# Patient Record
(demographics unavailable — no encounter records)

---

## 2025-04-11 NOTE — HEALTH RISK ASSESSMENT
[Good] : ~his/her~  mood as  good [No] : In the past 12 months have you used drugs other than those required for medical reasons? No [Any fall with injury in past year] : Patient reported fall with injury in the past year [0] : 1) Little interest or pleasure doing things: Not at all (0) [With Significant Other] : lives with significant other [Retired] : retired [Fully functional (bathing, dressing, toileting, transferring, walking, feeding)] : Fully functional (bathing, dressing, toileting, transferring, walking, feeding) [Fully functional (using the telephone, shopping, preparing meals, housekeeping, doing laundry, using] : Fully functional and needs no help or supervision to perform IADLs (using the telephone, shopping, preparing meals, housekeeping, doing laundry, using transportation, managing medications and managing finances) [Smoke Detector] : smoke detector [Safety elements used in home] : safety elements used in home [Seat Belt] :  uses seat belt [Sunscreen] : uses sunscreen [Never] : Never [HIV test declined] : HIV test declined [Hepatitis C test declined] : Hepatitis C test declined [FreeTextEntry1] : throat dryness [Audit-CScore] : 0 [de-identified] : walking [de-identified] : trying [de-identified] : pt fell and hurt her shoulder and jaw [Reports changes in hearing] : Reports no changes in hearing [Reports changes in vision] : Reports no changes in vision [Reports changes in dental health] : Reports no changes in dental health [MammogramDate] : 03/2024 [MammogramComments] : H/O 4 Surgeries Breast Lumpectomies / [facility]  [PapSmearComments] : awhile [BoneDensityComments] : awhile [ColonoscopyComments] : awhile

## 2025-04-11 NOTE — HEALTH RISK ASSESSMENT
[Good] : ~his/her~  mood as  good [No] : In the past 12 months have you used drugs other than those required for medical reasons? No [Any fall with injury in past year] : Patient reported fall with injury in the past year [0] : 1) Little interest or pleasure doing things: Not at all (0) [With Significant Other] : lives with significant other [Retired] : retired [Fully functional (bathing, dressing, toileting, transferring, walking, feeding)] : Fully functional (bathing, dressing, toileting, transferring, walking, feeding) [Fully functional (using the telephone, shopping, preparing meals, housekeeping, doing laundry, using] : Fully functional and needs no help or supervision to perform IADLs (using the telephone, shopping, preparing meals, housekeeping, doing laundry, using transportation, managing medications and managing finances) [Smoke Detector] : smoke detector [Safety elements used in home] : safety elements used in home [Seat Belt] :  uses seat belt [Sunscreen] : uses sunscreen [Never] : Never [HIV test declined] : HIV test declined [Hepatitis C test declined] : Hepatitis C test declined [FreeTextEntry1] : throat dryness [Audit-CScore] : 0 [de-identified] : walking [de-identified] : trying [de-identified] : pt fell and hurt her shoulder and jaw [Reports changes in hearing] : Reports no changes in hearing [Reports changes in vision] : Reports no changes in vision [Reports changes in dental health] : Reports no changes in dental health [MammogramDate] : 03/2024 [MammogramComments] : H/O 4 Surgeries Breast Lumpectomies / [facility]  [PapSmearComments] : awhile [BoneDensityComments] : awhile [ColonoscopyComments] : awhile

## 2025-04-11 NOTE — HISTORY OF PRESENT ILLNESS
[Friend] : friend [FreeTextEntry1] : New Pt [de-identified] : 69 y toshia presents today, seeking new PCP, Throat , wakes up in middle of night, bad taste in mouth, +Phegm, reports taking pantoprazole approx 1 year with no relief . Retired from years as  at Hospital Unit/ Administrative type position, lives with , Pt/  go back and forth to [facility] ( Home of Origin) Reports multiple Right Breast Biopsies with multiple Lumpectomies , suggested by 2 Breast Specialists 2 get Breast removed ( Precautionary) Pt declined this suggestion. Did not take BP pill this AM, rushing to get to Appt?

## 2025-04-11 NOTE — HISTORY OF PRESENT ILLNESS
[Friend] : friend [FreeTextEntry1] : New Pt [de-identified] : 69 y toshia presents today, seeking new PCP, Throat , wakes up in middle of night, bad taste in mouth, +Phegm, reports taking pantoprazole approx 1 year with no relief . Retired from years as  at Hospital Unit/ Administrative type position, lives with , Pt/  go back and forth to [facility] ( Home of Origin) Reports multiple Right Breast Biopsies with multiple Lumpectomies , suggested by 2 Breast Specialists 2 get Breast removed ( Precautionary) Pt declined this suggestion. Did not take BP pill this AM, rushing to get to Appt?

## 2025-06-05 NOTE — HISTORY OF PRESENT ILLNESS
[Friend] : friend [FreeTextEntry1] : BP Re-check/ HTN [de-identified] : 69 yr old presents today with her friend again, feels well overall. Reports ran out of medication approx 1 week .

## 2025-06-05 NOTE — PHYSICAL EXAM
[No Acute Distress] : no acute distress [No JVD] : no jugular venous distention [Normal] : normal rate, regular rhythm, normal S1 and S2 and no murmur heard [No Edema] : there was no peripheral edema [Soft] : abdomen soft [Normal Anterior Cervical Nodes] : no anterior cervical lymphadenopathy [No CVA Tenderness] : no CVA  tenderness [No Joint Swelling] : no joint swelling [No Rash] : no rash [Coordination Grossly Intact] : coordination grossly intact [Speech Grossly Normal] : speech grossly normal [Normal Mood] : the mood was normal

## 2025-06-05 NOTE — HEALTH RISK ASSESSMENT
[No] : In the past 12 months have you used drugs other than those required for medical reasons? No [No falls in past year] : Patient reported no falls in the past year [0] : 2) Feeling down, depressed, or hopeless: Not at all (0) [Never] : Never [de-identified] : none [de-identified] : none [Audit-CScore] : 0 [de-identified] : walking [de-identified] : trying [[id number]] : 0

## 2025-06-05 NOTE — HEALTH RISK ASSESSMENT
[No] : In the past 12 months have you used drugs other than those required for medical reasons? No [No falls in past year] : Patient reported no falls in the past year [0] : 2) Feeling down, depressed, or hopeless: Not at all (0) [Never] : Never [de-identified] : none [de-identified] : none [Audit-CScore] : 0 [de-identified] : walking [de-identified] : trying [[id number]] : 0

## 2025-06-05 NOTE — PLAN
[FreeTextEntry1] : 69 yr old F/U    HTN- Needs better control ++ Importance of not skipping days of BP Meds++ Continue low salt diet Meds daily, Home checking/ Logging  F/U 3-4 weeks BP Re-check/ Log

## 2025-06-05 NOTE — HISTORY OF PRESENT ILLNESS
[Friend] : friend [FreeTextEntry1] : BP Re-check/ HTN [de-identified] : 69 yr old presents today with her friend again, feels well overall. Reports ran out of medication approx 1 week .